# Patient Record
Sex: FEMALE | Race: WHITE | NOT HISPANIC OR LATINO
[De-identification: names, ages, dates, MRNs, and addresses within clinical notes are randomized per-mention and may not be internally consistent; named-entity substitution may affect disease eponyms.]

---

## 2017-06-12 ENCOUNTER — RESULT REVIEW (OUTPATIENT)
Age: 52
End: 2017-06-12

## 2017-06-20 ENCOUNTER — APPOINTMENT (OUTPATIENT)
Dept: MAMMOGRAPHY | Facility: CLINIC | Age: 52
End: 2017-06-20

## 2017-06-20 ENCOUNTER — OUTPATIENT (OUTPATIENT)
Dept: OUTPATIENT SERVICES | Facility: HOSPITAL | Age: 52
LOS: 1 days | End: 2017-06-20
Payer: COMMERCIAL

## 2017-06-20 DIAGNOSIS — Z00.8 ENCOUNTER FOR OTHER GENERAL EXAMINATION: ICD-10-CM

## 2017-06-20 PROCEDURE — 77067 SCR MAMMO BI INCL CAD: CPT

## 2017-06-20 PROCEDURE — 77063 BREAST TOMOSYNTHESIS BI: CPT

## 2017-09-26 ENCOUNTER — APPOINTMENT (OUTPATIENT)
Dept: UROLOGY | Facility: CLINIC | Age: 52
End: 2017-09-26
Payer: COMMERCIAL

## 2017-09-26 DIAGNOSIS — M25.552 PAIN IN LEFT HIP: ICD-10-CM

## 2017-09-26 DIAGNOSIS — M25.512 PAIN IN LEFT SHOULDER: ICD-10-CM

## 2017-09-26 DIAGNOSIS — R35.1 NOCTURIA: ICD-10-CM

## 2017-09-26 DIAGNOSIS — Z86.39 PERSONAL HISTORY OF OTHER ENDOCRINE, NUTRITIONAL AND METABOLIC DISEASE: ICD-10-CM

## 2017-09-26 DIAGNOSIS — R31.9 HEMATURIA, UNSPECIFIED: ICD-10-CM

## 2017-09-26 LAB
BILIRUB UR QL STRIP: NORMAL
CLARITY UR: NORMAL
COLLECTION METHOD: NORMAL
GLUCOSE UR-MCNC: NORMAL
HCG UR QL: NORMAL EU/DL
HGB UR QL STRIP.AUTO: NORMAL
KETONES UR-MCNC: NORMAL
LEUKOCYTE ESTERASE UR QL STRIP: NORMAL
NITRITE UR QL STRIP: NORMAL
PH UR STRIP: NORMAL
PROT UR STRIP-MCNC: NORMAL
SP GR UR STRIP: NORMAL

## 2017-09-26 PROCEDURE — 81003 URINALYSIS AUTO W/O SCOPE: CPT | Mod: QW

## 2017-09-26 PROCEDURE — 99204 OFFICE O/P NEW MOD 45 MIN: CPT

## 2017-09-28 LAB
APPEARANCE: CLEAR
BACTERIA UR CULT: NORMAL
BACTERIA: NEGATIVE
BILIRUBIN URINE: NEGATIVE
BLOOD URINE: ABNORMAL
COLOR: YELLOW
GLUCOSE QUALITATIVE U: NORMAL MG/DL
HYALINE CASTS: 1 /LPF
KETONES URINE: ABNORMAL
LEUKOCYTE ESTERASE URINE: NEGATIVE
MICROSCOPIC-UA: NORMAL
NITRITE URINE: NEGATIVE
PH URINE: 5
PROTEIN URINE: NEGATIVE MG/DL
RED BLOOD CELLS URINE: 3 /HPF
SPECIFIC GRAVITY URINE: 1.02
SQUAMOUS EPITHELIAL CELLS: 1 /HPF
UROBILINOGEN URINE: NORMAL MG/DL
WHITE BLOOD CELLS URINE: 3 /HPF

## 2017-10-03 ENCOUNTER — LABORATORY RESULT (OUTPATIENT)
Age: 52
End: 2017-10-03

## 2017-10-04 ENCOUNTER — FORM ENCOUNTER (OUTPATIENT)
Age: 52
End: 2017-10-04

## 2017-10-05 ENCOUNTER — OUTPATIENT (OUTPATIENT)
Dept: OUTPATIENT SERVICES | Facility: HOSPITAL | Age: 52
LOS: 1 days | End: 2017-10-05
Payer: COMMERCIAL

## 2017-10-05 ENCOUNTER — APPOINTMENT (OUTPATIENT)
Dept: CT IMAGING | Facility: CLINIC | Age: 52
End: 2017-10-05
Payer: COMMERCIAL

## 2017-10-05 DIAGNOSIS — R31.0 GROSS HEMATURIA: ICD-10-CM

## 2017-10-05 PROCEDURE — 74178 CT ABD&PLV WO CNTR FLWD CNTR: CPT

## 2017-10-05 PROCEDURE — 74178 CT ABD&PLV WO CNTR FLWD CNTR: CPT | Mod: 26

## 2017-10-06 ENCOUNTER — TRANSCRIPTION ENCOUNTER (OUTPATIENT)
Age: 52
End: 2017-10-06

## 2017-10-06 LAB
ANION GAP SERPL CALC-SCNC: 14 MMOL/L
BUN SERPL-MCNC: 15 MG/DL
CALCIUM SERPL-MCNC: 10 MG/DL
CHLORIDE SERPL-SCNC: 104 MMOL/L
CO2 SERPL-SCNC: 26 MMOL/L
CREAT SERPL-MCNC: 0.9 MG/DL
GLUCOSE SERPL-MCNC: 90 MG/DL
POTASSIUM SERPL-SCNC: 4.7 MMOL/L
SODIUM SERPL-SCNC: 144 MMOL/L

## 2017-10-10 ENCOUNTER — APPOINTMENT (OUTPATIENT)
Dept: UROLOGY | Facility: CLINIC | Age: 52
End: 2017-10-10
Payer: COMMERCIAL

## 2017-10-10 VITALS
HEART RATE: 68 BPM | SYSTOLIC BLOOD PRESSURE: 156 MMHG | DIASTOLIC BLOOD PRESSURE: 89 MMHG | TEMPERATURE: 98.4 F | OXYGEN SATURATION: 100 %

## 2017-10-10 DIAGNOSIS — R31.0 GROSS HEMATURIA: ICD-10-CM

## 2017-10-10 PROCEDURE — 52000 CYSTOURETHROSCOPY: CPT

## 2017-10-13 PROBLEM — R31.0 GROSS HEMATURIA: Status: ACTIVE | Noted: 2017-09-26

## 2017-10-13 RX ORDER — NITROFURANTOIN (MONOHYDRATE/MACROCRYSTALS) 25; 75 MG/1; MG/1
100 CAPSULE ORAL
Qty: 20 | Refills: 0 | Status: ACTIVE | COMMUNITY
Start: 2017-09-25

## 2017-10-13 RX ORDER — METHYLPREDNISOLONE 4 MG/1
4 TABLET ORAL
Qty: 21 | Refills: 0 | Status: ACTIVE | COMMUNITY
Start: 2017-05-16

## 2017-10-13 RX ORDER — VALACYCLOVIR 1 G/1
1 TABLET, FILM COATED ORAL
Qty: 30 | Refills: 0 | Status: ACTIVE | COMMUNITY
Start: 2017-03-29

## 2017-10-13 RX ORDER — NITROFURANTOIN MACROCRYSTALS 100 MG/1
100 CAPSULE ORAL
Refills: 0 | Status: ACTIVE | COMMUNITY

## 2017-10-13 RX ORDER — PRAVASTATIN SODIUM 20 MG/1
20 TABLET ORAL
Refills: 0 | Status: ACTIVE | COMMUNITY

## 2017-10-16 LAB — CORE LAB FLUID CYTOLOGY: NORMAL

## 2017-10-27 ENCOUNTER — EMERGENCY (EMERGENCY)
Facility: HOSPITAL | Age: 52
LOS: 1 days | Discharge: HOME | End: 2017-10-27
Attending: EMERGENCY MEDICINE | Admitting: EMERGENCY MEDICINE
Payer: COMMERCIAL

## 2017-10-27 VITALS
WEIGHT: 141.98 LBS | DIASTOLIC BLOOD PRESSURE: 91 MMHG | TEMPERATURE: 98 F | SYSTOLIC BLOOD PRESSURE: 163 MMHG | OXYGEN SATURATION: 100 % | HEART RATE: 69 BPM | RESPIRATION RATE: 18 BRPM

## 2017-10-27 VITALS
SYSTOLIC BLOOD PRESSURE: 123 MMHG | TEMPERATURE: 98 F | DIASTOLIC BLOOD PRESSURE: 76 MMHG | OXYGEN SATURATION: 99 % | HEART RATE: 74 BPM | RESPIRATION RATE: 17 BRPM

## 2017-10-27 DIAGNOSIS — E78.5 HYPERLIPIDEMIA, UNSPECIFIED: ICD-10-CM

## 2017-10-27 DIAGNOSIS — R21 RASH AND OTHER NONSPECIFIC SKIN ERUPTION: ICD-10-CM

## 2017-10-27 DIAGNOSIS — Z88.5 ALLERGY STATUS TO NARCOTIC AGENT: ICD-10-CM

## 2017-10-27 DIAGNOSIS — Z88.0 ALLERGY STATUS TO PENICILLIN: ICD-10-CM

## 2017-10-27 DIAGNOSIS — Z88.1 ALLERGY STATUS TO OTHER ANTIBIOTIC AGENTS STATUS: ICD-10-CM

## 2017-10-27 DIAGNOSIS — Z88.2 ALLERGY STATUS TO SULFONAMIDES: ICD-10-CM

## 2017-10-27 PROCEDURE — 99284 EMERGENCY DEPT VISIT MOD MDM: CPT | Mod: 25

## 2017-10-27 PROCEDURE — 99284 EMERGENCY DEPT VISIT MOD MDM: CPT

## 2017-10-27 PROCEDURE — 96375 TX/PRO/DX INJ NEW DRUG ADDON: CPT

## 2017-10-27 PROCEDURE — 96374 THER/PROPH/DIAG INJ IV PUSH: CPT

## 2017-10-27 RX ORDER — FAMOTIDINE 10 MG/ML
20 INJECTION INTRAVENOUS ONCE
Qty: 0 | Refills: 0 | Status: COMPLETED | OUTPATIENT
Start: 2017-10-27 | End: 2017-10-27

## 2017-10-27 RX ORDER — DIPHENHYDRAMINE HCL 50 MG
25 CAPSULE ORAL ONCE
Qty: 0 | Refills: 0 | Status: COMPLETED | OUTPATIENT
Start: 2017-10-27 | End: 2017-10-27

## 2017-10-27 RX ADMIN — Medication 20 MILLIGRAM(S): at 15:30

## 2017-10-27 RX ADMIN — Medication 25 MILLIGRAM(S): at 15:30

## 2017-10-27 RX ADMIN — FAMOTIDINE 20 MILLIGRAM(S): 10 INJECTION INTRAVENOUS at 15:30

## 2017-10-27 NOTE — ED ADULT TRIAGE NOTE - CHIEF COMPLAINT QUOTE
c/o hives all over the body noted at around 03:00 then was started on Prednisone by Urgent care. at around 14:00H pt 's tongue started to swell with accompanied chest pain.

## 2017-10-27 NOTE — ED PROVIDER NOTE - ENMT, MLM
Airway patent, Nasal mucosa clear. Mouth with normal mucosa. Throat has no vesicles, no oropharyngeal exudates and uvula is midline. No tongue or throat swelling.

## 2017-10-27 NOTE — ED PROVIDER NOTE - OBJECTIVE STATEMENT
CO itchy rash on arms legs neck and trunk since this am. Used Diclofenac cream on wrists last night. Has had multiple medication reactions in the past. Seen at  today and given Benadryl and Prednisone 40mg. CO continued itchy rash. Had tongue swelling earlier since resolved.

## 2017-10-27 NOTE — ED ADULT NURSE REASSESSMENT NOTE - NS ED NURSE REASSESS COMMENT FT1
Pt was received C/O mild swelling in her tongue, and rashes on her neck and UE and generalized itching. However, pt denied CP, SOB, palpitation and dizziness. Pt was given her stat meds as ordered. Pt verbalized relief and feeling better S/P her medication administrations. No S/S of distress noted. Pt was discharged as ordered.

## 2017-10-27 NOTE — ED PROVIDER NOTE - MEDICAL DECISION MAKING DETAILS
Allergic reaction possibly to Diclofenac. Already took Benadryl and Prednisone. Plan increase Prednisone to 60mg daily. Dose of IV benadryl and pepcid now. Observe.

## 2017-10-27 NOTE — ED PROVIDER NOTE - CHPI ED SYMPTOMS NEG
no wheezing/no shortness of breath/no vomiting/no difficulty breathing/no nausea/no throat itching/no difficulty swallowing/no cough

## 2017-10-27 NOTE — ED ADULT NURSE NOTE - OBJECTIVE STATEMENT
pt with complaints of hives worsening and of tongue swelling after pt took prednisone she received from urgent care. pt took 2 25 mg benadryl before she came here. pt states tongue swelling has decreased but not fully dissapated. pt denies any breathing difficulties. but states hives are worsening. denies any new foods/detergents.

## 2017-10-27 NOTE — ED ADULT NURSE NOTE - PMH
Arthritis    Basal cell carcinoma  right thigh  Diverticulitis    Hyperlipidemia  "minimal"  Tendonitis  left shoulder

## 2018-06-21 ENCOUNTER — OUTPATIENT (OUTPATIENT)
Dept: OUTPATIENT SERVICES | Facility: HOSPITAL | Age: 53
LOS: 1 days | End: 2018-06-21
Payer: COMMERCIAL

## 2018-06-21 ENCOUNTER — APPOINTMENT (OUTPATIENT)
Dept: MAMMOGRAPHY | Facility: CLINIC | Age: 53
End: 2018-06-21
Payer: COMMERCIAL

## 2018-06-21 DIAGNOSIS — Z00.8 ENCOUNTER FOR OTHER GENERAL EXAMINATION: ICD-10-CM

## 2018-06-21 PROCEDURE — 77067 SCR MAMMO BI INCL CAD: CPT

## 2018-06-21 PROCEDURE — 77067 SCR MAMMO BI INCL CAD: CPT | Mod: 26

## 2018-06-21 PROCEDURE — 77063 BREAST TOMOSYNTHESIS BI: CPT | Mod: 26

## 2018-06-21 PROCEDURE — 77063 BREAST TOMOSYNTHESIS BI: CPT

## 2018-10-27 ENCOUNTER — TRANSCRIPTION ENCOUNTER (OUTPATIENT)
Age: 53
End: 2018-10-27

## 2019-06-04 ENCOUNTER — APPOINTMENT (OUTPATIENT)
Dept: ORTHOPEDIC SURGERY | Facility: CLINIC | Age: 54
End: 2019-06-04
Payer: COMMERCIAL

## 2019-06-04 VITALS
DIASTOLIC BLOOD PRESSURE: 83 MMHG | SYSTOLIC BLOOD PRESSURE: 138 MMHG | WEIGHT: 145 LBS | HEIGHT: 68 IN | BODY MASS INDEX: 21.98 KG/M2 | HEART RATE: 63 BPM

## 2019-06-04 DIAGNOSIS — M25.511 PAIN IN RIGHT SHOULDER: ICD-10-CM

## 2019-06-04 PROCEDURE — 73030 X-RAY EXAM OF SHOULDER: CPT | Mod: RT

## 2019-06-04 PROCEDURE — 99214 OFFICE O/P EST MOD 30 MIN: CPT | Mod: 25

## 2019-06-04 PROCEDURE — 20610 DRAIN/INJ JOINT/BURSA W/O US: CPT | Mod: RT

## 2019-07-16 PROBLEM — M25.511 RIGHT SHOULDER PAIN: Status: ACTIVE | Noted: 2019-07-16

## 2019-07-16 NOTE — PROCEDURE
[de-identified] : Injection: Right shoulder (Subacromial).\par Indication: Bursitis.\par \par A discussion was had with the patient regarding this procedure and all questions were answered. All risks, benefits and alternatives were discussed. These included but were not limited to bleeding, infection, and allergic reaction. Alcohol was used to clean the skin, and betadine was used to sterilize and prep the area in the posterior aspect of the right shoulder. Ethyl chloride spray was then used as a topical anesthetic. A 21-gauge needle was used to inject 4cc of 1% lidocaine and 1cc of 40mg/ml methylprednisolone into the right subacromial space. A sterile bandage was then applied. The patient tolerated the procedure well and there were no complications.

## 2019-07-16 NOTE — DISCUSSION/SUMMARY
[de-identified] : 54-year-old female status post right shoulder rotator cuff repair, new onset right shoulder pain \par \par A discussion was had with the patient regarding potential sources of inflammatory shoulder discomfort following rotator cuff repair. Patient presents with symptoms of bursitis with irritation of the subacromial space. I do not believe she has significant reinjury to the rotator cuff at this time.\par \par Recommendations: Conservative modalities as above (overhead activity rest/activity avoidance until less symptommatic, ice, NSAIDs, home exercise strengthening and stretching program). Injection therapy provided for symptomatic relief\par \par Injection therapy performed to the subacromial space today for therapeutic and diagnostic purposes. \par \par Followup: 2-3mos as needed

## 2019-07-16 NOTE — HISTORY OF PRESENT ILLNESS
[de-identified] : 54 year old female status post left shoulder arthroscopy with subacromial decompression and rotator cuff repair 5/12/15, presents today with right shoulder pain since February 2019. No new injury reported. She stayed away from upper extremity exercises for 8 weeks which has not been helpful. The pain is intermittent brought on when laying on it and has modified movements playing tennis due to pain. Taking Ibuprofen PRN which is helpful. She is here because she think that she may have torn her RTC. Localizes pain to the anterior aspect of the shoulder.

## 2019-07-16 NOTE — PHYSICAL EXAM
[de-identified] : Oriented to time, place, person\par Mood: Normal\par Affect: Normal\par Appearance: Healthy, well appearing, no acute distress.\par Gait: Normal\par Assistive Devices: None\par \par Left shoulder exam\par \par Inspection: No malalignment, No defects, No atrophy\par Skin: No masses, No lesions\par Neck: Negative Spurlings, full ROM, no pain with ROM\par AROM: FF to 180, abduction to 90, ER to 70, IR to the lumbar\par PROM: full\par Painful arc ROM: Painful forward flexion\par Tenderness: Mild bicipital tenderness, mild tenderness to the greater tuberosity/RTC insertion, no anterior shoulder/lesser tuberosity tenderness\par Strength: 4+/5 ER, 5/5 IR in adduction, 4+/5 supraspinatus testing, positive O'Briens test\par AC Joint: No ttp/pain with cross arm testing\par Biceps: Speed Negative, Yergusons Negative\par Impingement test: Positive Baeza, Positive Neer \par Stability: Stable\par Vasc: 2+ radial pulse\par Neuro: AIN, PIN, Ulnar nerve in tact to motor\par Sensation: In tact to light touch throughout\par \par  [de-identified] : \par The following radiographs were ordered and read by me during this patients visit. I reviewed each radiograph in detail with the patient and discussed the findings as highlighted below. \par \par 3 views of the right shoulder were obtained today that show no acute fracture or dislocation. There is no glenohumeral and min AC joint degenerative change seen. Postop changes to the acromion. There is no significant malalignment. No significant other obvious osseous abnormality, otherwise unremarkable.

## 2019-10-02 ENCOUNTER — RX RENEWAL (OUTPATIENT)
Age: 54
End: 2019-10-02

## 2019-10-03 ENCOUNTER — FORM ENCOUNTER (OUTPATIENT)
Age: 54
End: 2019-10-03

## 2019-10-04 ENCOUNTER — OUTPATIENT (OUTPATIENT)
Dept: OUTPATIENT SERVICES | Facility: HOSPITAL | Age: 54
LOS: 1 days | End: 2019-10-04
Payer: COMMERCIAL

## 2019-10-04 ENCOUNTER — APPOINTMENT (OUTPATIENT)
Dept: MRI IMAGING | Facility: CLINIC | Age: 54
End: 2019-10-04
Payer: COMMERCIAL

## 2019-10-04 DIAGNOSIS — M25.511 PAIN IN RIGHT SHOULDER: ICD-10-CM

## 2019-10-04 PROCEDURE — 73221 MRI JOINT UPR EXTREM W/O DYE: CPT

## 2019-10-04 PROCEDURE — 73221 MRI JOINT UPR EXTREM W/O DYE: CPT | Mod: 26,RT

## 2019-10-14 ENCOUNTER — TRANSCRIPTION ENCOUNTER (OUTPATIENT)
Age: 54
End: 2019-10-14

## 2019-10-28 ENCOUNTER — APPOINTMENT (OUTPATIENT)
Dept: ORTHOPEDIC SURGERY | Facility: CLINIC | Age: 54
End: 2019-10-28
Payer: COMMERCIAL

## 2019-10-28 VITALS — WEIGHT: 145 LBS | BODY MASS INDEX: 21.98 KG/M2 | HEIGHT: 68 IN

## 2019-10-28 PROCEDURE — 99214 OFFICE O/P EST MOD 30 MIN: CPT

## 2019-10-30 NOTE — PHYSICAL EXAM
[de-identified] : General: well-appearing, not in acute distress and not obese. \par Gait: normal. \par Oriented to time, place, person\par Mood: Normal\par Affect: Normal\par \par Right shoulder exam\par \par Inspection: No malalignment, No defects, No atrophy\par Skin: No masses, No lesions\par Neck: Negative Spurling's, full ROM, no pain with ROM\par AROM: FF to 180, abduction to 90, ER to 60, IR to mid thoracic\par Painful arc ROM: none\par Tenderness: no bicipital tenderness, + tenderness to the greater tuberosity/RTC insertion, no anterior shoulder/lesser tuberosity tenderness\par Strength: 5/5 ER, 5/5 IR in adduction, 5/5 supraspinatus testing, negative [place]'s test\par AC Joint: No ttp/pain with cross arm testing\par Biceps: Speed Negative, Yergusons Negative\par Impingement test: Negative Baeza, Negative Neer \par Stability: Stable\par Vasc: 2+ radial pulse\par Neuro: AIN, PIN, Ulnar nerve intact to motor\par Sensation: Intact to light touch throughout [de-identified] : MRI right shoulder dated 10.4.19 shows evidence of moderate supraspinatus tendinosis, interstitial tearing anteriorly. A.c. joint arthrosis.\par \par 3 views of the right shoulder were obtained 6-4-19 that show no acute fracture or dislocation. There is no glenohumeral and min AC joint degenerative change seen. Postop changes to the acromion. There is no significant malalignment. No significant other obvious osseous abnormality, otherwise unremarkable.

## 2019-10-30 NOTE — HISTORY OF PRESENT ILLNESS
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 MRI right shoulder dated 10.4.19 shows evidence of interstitial tearing anterior SS. She received a cortisone injection in June which gave her ~6 weeks of relief. The pain is intermittent brought on with lifting overhead. The pain wakes her up at night. Able to finish playing tennis this summer, but may look for fixation if still symptommatic early 2020.

## 2019-10-30 NOTE — ADDENDUM
[FreeTextEntry1] : This note was written by Zina Gomes on 10/28/2019 acting solely as a scribe for Dr. Oswaldo Rodriguez.\par \par All medical record entries made by the Scribe were at my, Dr. Oswaldo Rodriguez, direction and personally dictated by me on 10/28/2019. I have personally reviewed the chart and agree that the record accurately reflects my personal performance of the history, physical exam, assessment and plan.\par

## 2019-10-30 NOTE — DISCUSSION/SUMMARY
[de-identified] : 54-year-old female with right shoulder pain.\par \par Patient has some degeneration of the anterior rotator cuff, there does not appear to be clinically severe, nor does it show any significant high-grade tearing as was the case on the contralateral side. Patient is weighing options regarding surgical intervention versus conservative management. Considering that she is unable to undergo any surgical intervention until early 2020, recommendation at this time as a trial of conservative treatment with physical therapy modalities. If no significant benefit, the patient may opt for surgical debridement/repair as needed.\par \par Recommendations: Conservative modalities as above (overhead activity rest/activity avoidance until less symptomatic, ice, NSAIDs, home exercise strengthening and stretching program).\par \par Begin a trial of PT. Rx given. \par \par Followup: 2 months for possible surgical intervention.

## 2020-02-19 ENCOUNTER — RESULT REVIEW (OUTPATIENT)
Age: 55
End: 2020-02-19

## 2020-02-19 ENCOUNTER — OUTPATIENT (OUTPATIENT)
Dept: OUTPATIENT SERVICES | Facility: HOSPITAL | Age: 55
LOS: 1 days | Discharge: HOME | End: 2020-02-19
Payer: COMMERCIAL

## 2020-02-19 VITALS
HEART RATE: 74 BPM | RESPIRATION RATE: 28 BRPM | OXYGEN SATURATION: 100 % | WEIGHT: 151.02 LBS | HEIGHT: 68 IN | TEMPERATURE: 98 F | DIASTOLIC BLOOD PRESSURE: 74 MMHG | SYSTOLIC BLOOD PRESSURE: 138 MMHG

## 2020-02-19 DIAGNOSIS — Z88.2 ALLERGY STATUS TO SULFONAMIDES: ICD-10-CM

## 2020-02-19 DIAGNOSIS — Z88.5 ALLERGY STATUS TO NARCOTIC AGENT: ICD-10-CM

## 2020-02-19 DIAGNOSIS — K57.30 DIVERTICULOSIS OF LARGE INTESTINE WITHOUT PERFORATION OR ABSCESS WITHOUT BLEEDING: ICD-10-CM

## 2020-02-19 DIAGNOSIS — Z12.11 ENCOUNTER FOR SCREENING FOR MALIGNANT NEOPLASM OF COLON: ICD-10-CM

## 2020-02-19 DIAGNOSIS — Z88.1 ALLERGY STATUS TO OTHER ANTIBIOTIC AGENTS STATUS: ICD-10-CM

## 2020-02-19 DIAGNOSIS — K59.04 CHRONIC IDIOPATHIC CONSTIPATION: ICD-10-CM

## 2020-02-19 PROCEDURE — 88305 TISSUE EXAM BY PATHOLOGIST: CPT | Mod: 26

## 2020-02-19 PROCEDURE — 88305 TISSUE EXAM BY PATHOLOGIST: CPT

## 2020-02-19 RX ORDER — ALPRAZOLAM 0.25 MG
1 TABLET ORAL
Qty: 0 | Refills: 0 | DISCHARGE

## 2020-02-24 DIAGNOSIS — K57.30 DIVERTICULOSIS OF LARGE INTESTINE WITHOUT PERFORATION OR ABSCESS WITHOUT BLEEDING: ICD-10-CM

## 2020-02-24 DIAGNOSIS — K63.5 POLYP OF COLON: ICD-10-CM

## 2020-02-24 DIAGNOSIS — K59.04 CHRONIC IDIOPATHIC CONSTIPATION: ICD-10-CM

## 2020-02-24 DIAGNOSIS — Z88.2 ALLERGY STATUS TO SULFONAMIDES: ICD-10-CM

## 2020-02-24 DIAGNOSIS — Z86.010 PERSONAL HISTORY OF COLONIC POLYPS: ICD-10-CM

## 2020-02-24 DIAGNOSIS — E78.5 HYPERLIPIDEMIA, UNSPECIFIED: ICD-10-CM

## 2020-02-24 DIAGNOSIS — Z88.5 ALLERGY STATUS TO NARCOTIC AGENT: ICD-10-CM

## 2020-02-24 DIAGNOSIS — Z88.1 ALLERGY STATUS TO OTHER ANTIBIOTIC AGENTS STATUS: ICD-10-CM

## 2020-02-24 DIAGNOSIS — K21.9 GASTRO-ESOPHAGEAL REFLUX DISEASE WITHOUT ESOPHAGITIS: ICD-10-CM

## 2020-02-24 DIAGNOSIS — K64.8 OTHER HEMORRHOIDS: ICD-10-CM

## 2020-02-24 DIAGNOSIS — Z88.0 ALLERGY STATUS TO PENICILLIN: ICD-10-CM

## 2020-06-25 ENCOUNTER — APPOINTMENT (OUTPATIENT)
Dept: ORTHOPEDIC SURGERY | Facility: CLINIC | Age: 55
End: 2020-06-25
Payer: COMMERCIAL

## 2020-06-25 VITALS — BODY MASS INDEX: 21.98 KG/M2 | HEIGHT: 68 IN | WEIGHT: 145 LBS

## 2020-06-25 VITALS — TEMPERATURE: 97.7 F

## 2020-06-25 DIAGNOSIS — M25.562 PAIN IN LEFT KNEE: ICD-10-CM

## 2020-06-25 DIAGNOSIS — G89.29 PAIN IN LEFT KNEE: ICD-10-CM

## 2020-06-25 PROCEDURE — 73564 X-RAY EXAM KNEE 4 OR MORE: CPT | Mod: LT

## 2020-06-25 PROCEDURE — 99214 OFFICE O/P EST MOD 30 MIN: CPT

## 2020-06-25 NOTE — PHYSICAL EXAM
[de-identified] : Oriented to time, place, person\par Mood: Normal\par Affect: Normal\par Appearance: Healthy, well appearing, no acute distress.\par Gait: Normal\par Assistive Devices: None\par \par Left knee exam\par \par Skin: Clean, dry, intact\par Inspection: No obvious malalignment, no masses, posterior joint swelling, positive effusion\par Pulses: 2+ DP/PT pulses\par ROM: 0-130 degrees of flexion.  Positive pain with deep knee flexion\par Tenderness: Positive MJLT. No LJLT. No pain over the patella facets. No pain to the quadriceps tendon. No pain to the patella tendon.  Mild posterior knee tenderness.\par Stability: Stable to varus, valgus. Negative lachman testing. Negative anterior drawer, negative posterior drawer.\par Strength: 5/5 Q/H/TA/GS/EHL, without atrophy\par Neuro: In tact to light touch throughout, DTR's normal\par Additional tests: Positive McMurrays test, Negative patellar grind test. \par  [de-identified] : \par The following radiographs were ordered and read by me during this patients visit. I reviewed each radiograph in detail with the patient and discussed the findings as highlighted below. \par \par 4 views of the left knee were obtained today that show no acute fracture or dislocation. There is no medial, no lateral and no patellofemoral degenerative change seen. There is no significant malalignment. No significant other obvious osseous abnormality, otherwise unremarkable.

## 2020-06-25 NOTE — HISTORY OF PRESENT ILLNESS
[de-identified] : 55 year old female presents today with left knee pain x ~1 year. No injury reported. The pain is presents when playing tennis, recently noticed that the knee swells. Takes ibuprofen as needed.  Bracing while playing tennis gives patient a sense of stability. Also recently started using a Peloton with fair intensity, and notices the pain when her leg falls out to the side. Pain is global, and non-specific. Denies buckling, locking, catching, numbness or tingling.

## 2020-06-25 NOTE — DISCUSSION/SUMMARY
[de-identified] : 55-year-old female with left knee pain\par \par Patient presents for evaluation of left knee pain. The patient's symptoms are consistent with possible meniscal pathology through the medial compartment of the knee with positive provocative meniscal testing as well as joint line tenderness.  Patient also has a significant swelling and mass to the posterior joint which is consistent with a Baker's cyst. Discussed with the patient in detail the concern for underlying internal derangement to the meniscus and possible treatment options that may include conservative management (e.g. RICE, activity modification, PT, injection therapy) versus operative arthroscopy. Discussed that treatment would ilkely depend on the nature of the injury, quality of the chondral surfaces (degree of arthrosis) as seen on MRI imaging. \par \par Recommendation: Rest, ice, compression, elevation (RICE) and OTC NSAID's as instructed until MRI imaging.  Limited impact loading activity\par \par Followup after MRI.

## 2020-06-29 ENCOUNTER — APPOINTMENT (OUTPATIENT)
Dept: MRI IMAGING | Facility: CLINIC | Age: 55
End: 2020-06-29
Payer: COMMERCIAL

## 2020-06-29 ENCOUNTER — RESULT REVIEW (OUTPATIENT)
Age: 55
End: 2020-06-29

## 2020-06-29 ENCOUNTER — OUTPATIENT (OUTPATIENT)
Dept: OUTPATIENT SERVICES | Facility: HOSPITAL | Age: 55
LOS: 1 days | End: 2020-06-29
Payer: COMMERCIAL

## 2020-06-29 DIAGNOSIS — Z00.8 ENCOUNTER FOR OTHER GENERAL EXAMINATION: ICD-10-CM

## 2020-06-29 PROCEDURE — 73721 MRI JNT OF LWR EXTRE W/O DYE: CPT | Mod: 26,LT

## 2020-06-29 PROCEDURE — 73721 MRI JNT OF LWR EXTRE W/O DYE: CPT

## 2020-07-08 ENCOUNTER — APPOINTMENT (OUTPATIENT)
Dept: ORTHOPEDIC SURGERY | Facility: CLINIC | Age: 55
End: 2020-07-08
Payer: COMMERCIAL

## 2020-07-08 PROCEDURE — 99214 OFFICE O/P EST MOD 30 MIN: CPT | Mod: 95

## 2020-07-09 NOTE — HISTORY OF PRESENT ILLNESS
[Verbal consent obtained from patient] : the patient, [unfilled] [Home] : at home, [unfilled] , at the time of the visit. [Medical Office: ([facility])___] : at the medical office located in  [FreeTextEntry4] : Mary Lou Haynes [de-identified] : 55 year old female presents today for follow up of left knee pain x ~1 year. She obtained MRI and is here for review of results, concern for a popliteal cyst/MMT. Pain has remained stable since last visit. No injury reported. The pain is presents when playing tennis/using Peloton, recently noticed that the knee swells. Takes ibuprofen as needed.  Bracing while playing tennis gives patient a sense of stability. Pain is global, and non-specific. Denies buckling, locking, catching, numbness or tingling.

## 2020-07-09 NOTE — PHYSICAL EXAM
[de-identified] : Oriented to time, place, person\par Mood: Normal\par Affect: Normal\par Appearance: Healthy, well appearing, no acute distress.\par Gait: Normal\par Assistive Devices: None\par \par Left knee exam\par \par Skin: Clean, dry, intact\par Inspection: No obvious malalignment, no masses, posterior joint swelling, positive effusion\par Pulses: Pink perfused\par ROM: 0-130 degrees of flexion.  Positive pain with deep knee flexion\par Tenderness: Positive MJLT. No LJLT. No pain over the patella facets. No pain to the quadriceps tendon. No pain to the patella tendon.  Mild posterior knee tenderness.\par Stability: Stable to varus, valgus. Negative lachman testing. Negative anterior drawer, negative posterior drawer.\par Strength: 5/5 Q/H/TA/GS/EHL, without atrophy\par Neuro: In tact to light touch throughout, DTR's normal\par Additional tests: Positive McMurrays test, Negative patellar grind test. \par  [de-identified] : 4 views of the left knee were obtained 6.25.20 that show no acute fracture or dislocation. There is no medial, no lateral and no patellofemoral degenerative change seen. There is no significant malalignment. No significant other obvious osseous abnormality, otherwise unremarkable.\par \par MRI left knee dated 6.29.20 shows evidence of a large posterior Baker's cyst 2.7x7.7cm.  Complex medial meniscus tear.

## 2020-07-09 NOTE — DISCUSSION/SUMMARY
[de-identified] : 55-year-old female with left knee popliteal cyst/MMT\par \par As expected, patient has a large posterior popliteal cyst consistent with degenerative meniscal pathology through the medial compartment.  Considering the size and location of the popliteal cyst, recommendation would be for aspiration of the cyst with local cortisone injection to see how her symptoms improve.  If pain resolves, I would continue to treat the popliteal cyst conservatively.  If pain were to persist through the posterior medial compartment, patient may be a candidate for surgical arthroscopy given the degenerative meniscus injury.\par \par Recommendation: Rest, ice, compression, elevation (RICE) and OTC NSAID's as instructed.  Limited impact loading activity as needed.\par \par Ultrasound aspiration left knee Rx provided.\par \par Followup after MRI.

## 2020-07-21 ENCOUNTER — OUTPATIENT (OUTPATIENT)
Dept: OUTPATIENT SERVICES | Facility: HOSPITAL | Age: 55
LOS: 1 days | End: 2020-07-21
Payer: COMMERCIAL

## 2020-07-21 ENCOUNTER — APPOINTMENT (OUTPATIENT)
Dept: ULTRASOUND IMAGING | Facility: CLINIC | Age: 55
End: 2020-07-21

## 2020-07-21 DIAGNOSIS — Z00.8 ENCOUNTER FOR OTHER GENERAL EXAMINATION: ICD-10-CM

## 2020-07-21 PROCEDURE — 20611 DRAIN/INJ JOINT/BURSA W/US: CPT

## 2020-07-21 PROCEDURE — 20611 DRAIN/INJ JOINT/BURSA W/US: CPT | Mod: LT

## 2020-08-03 ENCOUNTER — APPOINTMENT (OUTPATIENT)
Dept: ORTHOPEDIC SURGERY | Facility: CLINIC | Age: 55
End: 2020-08-03
Payer: COMMERCIAL

## 2020-08-03 VITALS — TEMPERATURE: 97.8 F

## 2020-08-03 DIAGNOSIS — M71.22 SYNOVIAL CYST OF POPLITEAL SPACE [BAKER], LEFT KNEE: ICD-10-CM

## 2020-08-03 PROCEDURE — 99214 OFFICE O/P EST MOD 30 MIN: CPT

## 2020-08-03 NOTE — HISTORY OF PRESENT ILLNESS
[de-identified] : 55 year old female presents today for follow up of chronic left knee pain.  She had an aspiration of popliteal cyst and cortisone injection  2 weeks ago which only gave her 2 weeks of relief. Pain has since returned. The pain is present when playing tennis/using Peloton, recently noticed that the knee swells. Takes ibuprofen as needed.  Bracing while playing tennis gives patient a sense of stability. Pain is posterior. Denies buckling, locking, catching, numbness or tingling.

## 2020-08-03 NOTE — DISCUSSION/SUMMARY
[de-identified] : 55-year-old female with left knee popliteal cyst/MMT\par \par Patient obtain some good relief of symptoms with aspiration, but affect was fleeting.  Patient continues to have pain radiating through the medial and posterior compartments consistent with degenerative tear of the medial meniscus.  Considering inability to improve significantly with pure aspiration, recommendation at this time is for consideration of surgical management with surgical arthroscopy of the left knee with partial meniscectomy.\par \par All risks, benefits and alternatives to the proposed surgical procedure, as well as the need for formal post-operative rehabilitation were discussed in great detail with the patient. Risks include but are not limited to pain, bleeding, infection, neurovascular injury, stiffness, arthrosis, medical complications (including DVT, PE, MI), and risks of anesthesia. \par \par A discussion was also had with the patient regarding additional risk given recent Covid-19 pandemic; including the potential additional risk of anesthesia and any medical comorbidities that the patient has.  It was made clear to the patient that we are taking all precautions regarding Covid-19 with regards to surgical scheduling and perioperative care.  The patient understands that current protocol requires Covid-19 testing no more than 48hrs prior to surgery, and PST's may be performed onsite at the day of surgery. \par \par The patient expressed understanding and all questions were answered. The patient is electing to proceed, and will have the patient scheduled accordingly.

## 2020-08-03 NOTE — PHYSICAL EXAM
[de-identified] : Oriented to time, place, person\par Mood: Normal\par Affect: Normal\par Appearance: Healthy, well appearing, no acute distress.\par Gait: Normal\par Assistive Devices: None\par \par Left knee exam\par \par Skin: Clean, dry, intact\par Inspection: No obvious malalignment, no masses, posterior joint swelling, positive effusion\par Pulses: Pink perfused\par ROM: 0-130 degrees of flexion.  Positive pain with deep knee flexion\par Tenderness: Positive MJLT. No LJLT. No pain over the patella facets. No pain to the quadriceps tendon. No pain to the patella tendon.  Mild posterior knee tenderness.\par Stability: Stable to varus, valgus. Negative lachman testing. Negative anterior drawer, negative posterior drawer.\par Strength: 5/5 Q/H/TA/GS/EHL, without atrophy\par Neuro: In tact to light touch throughout, DTR's normal\par Additional tests: Positive McMurrays test, Negative patellar grind test. \par  [de-identified] : 4 views of the left knee were obtained 6.25.20 that show no acute fracture or dislocation. There is no medial, no lateral and no patellofemoral degenerative change seen. There is no significant malalignment. No significant other obvious osseous abnormality, otherwise unremarkable.\par \par MRI left knee dated 6.29.20 shows evidence of a large posterior Baker's cyst 2.7x7.7cm.  Complex medial meniscus tear.

## 2020-08-17 ENCOUNTER — EMERGENCY (EMERGENCY)
Facility: HOSPITAL | Age: 55
LOS: 0 days | Discharge: HOME | End: 2020-08-17
Payer: COMMERCIAL

## 2020-08-17 VITALS
RESPIRATION RATE: 16 BRPM | HEART RATE: 68 BPM | SYSTOLIC BLOOD PRESSURE: 136 MMHG | TEMPERATURE: 99 F | DIASTOLIC BLOOD PRESSURE: 80 MMHG | OXYGEN SATURATION: 100 %

## 2020-08-17 DIAGNOSIS — Z11.59 ENCOUNTER FOR SCREENING FOR OTHER VIRAL DISEASES: ICD-10-CM

## 2020-08-17 DIAGNOSIS — Z03.818 ENCOUNTER FOR OBSERVATION FOR SUSPECTED EXPOSURE TO OTHER BIOLOGICAL AGENTS RULED OUT: ICD-10-CM

## 2020-08-17 PROCEDURE — U0003: CPT

## 2020-08-17 PROCEDURE — 99283 EMERGENCY DEPT VISIT LOW MDM: CPT

## 2020-08-17 NOTE — ED STATDOCS - OBJECTIVE STATEMENT
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

## 2020-08-17 NOTE — ED STATDOCS - PATIENT PORTAL LINK FT
You can access the FollowMyHealth Patient Portal offered by [facility] by registering at the following website: [email or web address]. By joining [facility]’s FollowMyHealth portal, you will also be able to view your health information using other applications (apps) compatible with our system.

## 2020-08-17 NOTE — ED ADULT TRIAGE NOTE - CHIEF COMPLAINT QUOTE
Pt presents to ED due to complaints of possible exposure for COVID19 testing.  Patient provides verbal consent to receive results via text or email.

## 2020-08-18 LAB — SARS-COV-2 RNA SPEC QL NAA+PROBE: SIGNIFICANT CHANGE UP

## 2020-08-26 RX ORDER — OXYCODONE AND ACETAMINOPHEN 5; 325 MG/1; MG/1
5-325 TABLET ORAL
Qty: 10 | Refills: 0 | Status: ACTIVE | COMMUNITY
Start: 2020-08-26 | End: 1900-01-01

## 2020-08-28 ENCOUNTER — APPOINTMENT (OUTPATIENT)
Dept: ORTHOPEDIC SURGERY | Facility: AMBULATORY SURGERY CENTER | Age: 55
End: 2020-08-28
Payer: COMMERCIAL

## 2020-08-28 PROCEDURE — 29880 ARTHRS KNE SRG MNISECTMY M&L: CPT | Mod: LT

## 2020-09-09 ENCOUNTER — APPOINTMENT (OUTPATIENT)
Dept: ORTHOPEDIC SURGERY | Facility: CLINIC | Age: 55
End: 2020-09-09
Payer: COMMERCIAL

## 2020-09-09 VITALS — TEMPERATURE: 98.2 F

## 2020-09-09 DIAGNOSIS — S83.232D COMPLEX TEAR OF MEDIAL MENISCUS, CURRENT INJURY, LEFT KNEE, SUBSEQUENT ENCOUNTER: ICD-10-CM

## 2020-09-09 PROCEDURE — 99024 POSTOP FOLLOW-UP VISIT: CPT

## 2020-09-10 PROBLEM — S83.232D COMPLEX TEAR OF MEDIAL MENISCUS OF LEFT KNEE AS CURRENT INJURY, SUBSEQUENT ENCOUNTER: Status: ACTIVE | Noted: 2020-07-08

## 2020-09-10 NOTE — HISTORY OF PRESENT ILLNESS
[0] : no pain reported [Chills] : no chills [Fever] : no fever [Vomiting] : no vomiting [Nausea] : no nausea [Discharge] : absent of discharge [Erythema] : not erythematous [Healed] : healed [Clean/Dry/Intact] : clean, dry and intact [Dehiscence] : not dehisced [Swelling] : not swollen [Vascular Intact] : ~T peripheral vascular exam normal [Neuro Intact] : an unremarkable neurological exam [Doing Well] : is doing well [de-identified] : 55-year-old female status post left knee PMM/PLM 8/28/20 [Excellent Pain Control] : has excellent pain control [No Sign of Infection] : is showing no signs of infection [de-identified] : Left knee exam\par \par Skin: Incision(s) clean, dry, intact, no drainage, healed\par Inspection: No significant swelling, no residual effusion\par Pulses: 2+ DP/PT pulses\par ROM: Full\par Tenderness: Minimal\par Stability: Stable\par Strength: Intact Q/H/TA/GS/EHL\par Neuro: In tact to light touch throughout [de-identified] : 55-year-old female status post left knee PMM/PLM. She is doing well. She is not taking pain medication. Denies post op complications.  Denies any significant pain, denies any issues from surgery.  Has return to low impact exercise already. [de-identified] : 55-year-old female status post left knee PMM/PLM\par \par The patient presents for the first postoperative visit following partial meniscectomy. All intraoperative imaging was discussed in detail with the patient including the quality and nature of the meniscus injury as well as the quality of the chondral surfaces. Discussion was also centered upon the rehabilitation required following arthroscopy, and patient is agreeable to treatment plan.\par \par Recommendations:\par 1. HEP: WBAT, AAROM/AROM to tolerance with immediate full ROM as goal.  We will hold off on PT at this time\par 2. Therapeutic exercises: Quad/Hamstring sets, co-contractions, SLR, HEP.\par 3. Meds: Wean pain medication, transition to OTC NSAID's/tylenol as tolerated\par 4. Bracing: None\par \par Follow up 4wks for re-evaluation.

## 2023-12-06 NOTE — ED PROVIDER NOTE - NS ED MD DISPO DISCHARGE
----- Message from Jc Sparrow MD sent at 12/6/2023  9:12 AM CST -----  I would like to inform you about the biopsy result of your endoscopy.  The tissue sample from polyp was removed did not survive processing.  I recommend repeating colonoscopy in 5 years given the quality of your prep.    Sincerely yours,   Jc Sparrow MD  Gastroenterology     Home

## 2024-03-01 ENCOUNTER — APPOINTMENT (OUTPATIENT)
Dept: CT IMAGING | Facility: CLINIC | Age: 59
End: 2024-03-01
Payer: SELF-PAY

## 2024-03-01 PROCEDURE — 75571 CT HRT W/O DYE W/CA TEST: CPT

## 2024-05-20 ENCOUNTER — APPOINTMENT (OUTPATIENT)
Dept: ORTHOPEDIC SURGERY | Facility: CLINIC | Age: 59
End: 2024-05-20
Payer: COMMERCIAL

## 2024-05-20 DIAGNOSIS — M75.111 INCOMPLETE ROTATOR CUFF TEAR OR RUPTURE OF RIGHT SHOULDER, NOT SPECIFIED AS TRAUMATIC: ICD-10-CM

## 2024-05-20 PROCEDURE — 99203 OFFICE O/P NEW LOW 30 MIN: CPT

## 2024-05-21 PROBLEM — M75.111 NONTRAUMATIC INCOMPLETE TEAR OF RIGHT ROTATOR CUFF: Status: ACTIVE | Noted: 2019-10-30

## 2024-05-21 NOTE — DISCUSSION/SUMMARY
[de-identified] : 59-year-old female with right shoulder pain.  Patient presents for further evaluation of her right shoulder.  Symptoms have been manageable and prior MRI suggested low-grade degenerative interstitial tearing.  Patient's symptoms today appear to be more related through the clavicle consistent with AC joint/sternoclavicular joint irritation.  However, given recent progression of symptoms we recommended additional MRI imaging to determine any progression of rotator cuff disease.  Patient is agreeable.    Recommendations: Rest/activity avoidance until less symptomatic with subsequent gradual return to full activity as tolerated. Patient may also use OTC NSAID's or acetaminophen as tolerated, with application of ice to the area 2-3x daily for 20 minutes after periods of activity.  MRI imaging right shoulder.  Followup after MRI.

## 2024-05-21 NOTE — HISTORY OF PRESENT ILLNESS
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 MRI right shoulder in 2019 shows evidence of interstitial tearing anterior SS. She received a cortisone injection in June 2019 which gave her ~6 weeks of relief.  Patient completed PT in 2019, was unable to continue due to the pandemic. She has noticed that in the past 6 months pain has gotten worse. Patient playing tennis 4 x per week and is playing through the pain. The pain is presents with certain movements, lifting overhead and exertion. She is status post left shoulder arthroscopy with subacromial decompression and rotator cuff repair 5/12/15 with no active symptoms.

## 2024-05-21 NOTE — PHYSICAL EXAM
[de-identified] : Right shoulder exam  Inspection: No malalignment, No defects, No atrophy Skin: No masses, No lesions Neck: Negative Spurling's, full ROM, no pain with ROM AROM: FF to 180, abduction to 90, ER to 60, IR to mid thoracic Painful arc ROM: Pain with terminal ROM Tenderness: +bicipital tenderness, +pain over the sternoclavicular joint no tenderness to the greater tuberosity/RTC insertion, no anterior shoulder/lesser tuberosity tenderness Strength: 5/5 ER, 5/5 IR in adduction, 5/5 supraspinatus testing, + [place]'s test AC Joint: mild ttp/pain with cross arm testing Biceps: Speed Negative, [place]ons Negative Impingement test: +Baeza, +Neer  Stability: Stable Vasc: 2+ radial pulse Neuro: AIN, PIN, Ulnar nerve intact to motor Sensation: Intact to light touch throughout [de-identified] : MRI right shoulder dated 10.4.19 shows evidence of moderate supraspinatus tendinosis, interstitial tearing anteriorly. A.c. joint arthrosis.  3 views of the right shoulder were obtained 6-4-19 that show no acute fracture or dislocation. There is no glenohumeral and min AC joint degenerative change seen. Postop changes to the acromion. There is no significant malalignment. No significant other obvious osseous abnormality, otherwise unremarkable.

## 2024-05-21 NOTE — ADDENDUM
[FreeTextEntry1] : This note was written by Shanel Caal on 05/20/2024 acting solely as a scribe for Dr. Oswaldo Rodriguez.  All medical record entries made by the Scribe were at my, Dr. Oswaldo Rodriguez, direction and personally dictated by me on 05/20/2024. I have personally reviewed the chart and agree that the record accurately reflects my personal performance of the history, physical exam, assessment and plan.

## 2025-06-10 ENCOUNTER — APPOINTMENT (OUTPATIENT)
Facility: CLINIC | Age: 60
End: 2025-06-10

## 2025-06-10 VITALS
DIASTOLIC BLOOD PRESSURE: 78 MMHG | SYSTOLIC BLOOD PRESSURE: 126 MMHG | HEART RATE: 65 BPM | BODY MASS INDEX: 22.58 KG/M2 | HEIGHT: 68 IN | WEIGHT: 149 LBS

## 2025-06-10 LAB
BILIRUB UR QL STRIP: NEGATIVE
CLARITY UR: CLEAR
COLLECTION METHOD: NORMAL
GLUCOSE UR-MCNC: NEGATIVE
HCG UR QL: 0.2 EU/DL
HGB UR QL STRIP.AUTO: NORMAL
KETONES UR-MCNC: NEGATIVE
LEUKOCYTE ESTERASE UR QL STRIP: NEGATIVE
NITRITE UR QL STRIP: NEGATIVE
PH UR STRIP: 6
PROT UR STRIP-MCNC: NEGATIVE
SP GR UR STRIP: 1.01

## 2025-06-10 PROCEDURE — 99459 PELVIC EXAMINATION: CPT

## 2025-06-10 PROCEDURE — 99204 OFFICE O/P NEW MOD 45 MIN: CPT | Mod: 25

## 2025-06-10 PROCEDURE — 51701 INSERT BLADDER CATHETER: CPT

## 2025-06-10 PROCEDURE — 81003 URINALYSIS AUTO W/O SCOPE: CPT | Mod: QW

## 2025-06-11 LAB
APPEARANCE: CLEAR
BACTERIA: NEGATIVE /HPF
BILIRUBIN URINE: NEGATIVE
BLOOD URINE: NEGATIVE
CAST: 0 /LPF
COLOR: YELLOW
EPITHELIAL CELLS: 0 /HPF
GLUCOSE QUALITATIVE U: NEGATIVE MG/DL
KETONES URINE: NEGATIVE MG/DL
LEUKOCYTE ESTERASE URINE: NEGATIVE
MICROSCOPIC-UA: NORMAL
NITRITE URINE: NEGATIVE
PH URINE: 6
PROTEIN URINE: NEGATIVE MG/DL
RED BLOOD CELLS URINE: 0 /HPF
SPECIFIC GRAVITY URINE: 1.01
UROBILINOGEN URINE: 0.2 MG/DL
WHITE BLOOD CELLS URINE: 0 /HPF

## 2025-06-18 LAB — BACTERIA UR CULT: NORMAL

## 2025-06-22 ENCOUNTER — NON-APPOINTMENT (OUTPATIENT)
Age: 60
End: 2025-06-22

## 2025-06-30 ENCOUNTER — TRANSCRIPTION ENCOUNTER (OUTPATIENT)
Age: 60
End: 2025-06-30

## 2025-06-30 ENCOUNTER — APPOINTMENT (OUTPATIENT)
Dept: UROGYNECOLOGY | Facility: CLINIC | Age: 60
End: 2025-06-30

## 2025-06-30 VITALS — DIASTOLIC BLOOD PRESSURE: 80 MMHG | SYSTOLIC BLOOD PRESSURE: 120 MMHG

## 2025-06-30 PROCEDURE — 51729 CYSTOMETROGRAM W/VP&UP: CPT

## 2025-06-30 PROCEDURE — 51797 INTRAABDOMINAL PRESSURE TEST: CPT

## 2025-06-30 PROCEDURE — 51784 ANAL/URINARY MUSCLE STUDY: CPT

## 2025-06-30 PROCEDURE — 51741 ELECTRO-UROFLOWMETRY FIRST: CPT

## 2025-06-30 RX ORDER — ALPRAZOLAM 0.25 MG/1
0.25 TABLET ORAL
Refills: 0 | Status: ACTIVE | COMMUNITY

## 2025-06-30 RX ORDER — ESTRADIOL 1.25 MG/1.25G
GEL TOPICAL
Refills: 0 | Status: ACTIVE | COMMUNITY

## 2025-06-30 RX ORDER — PRAVASTATIN SODIUM 10 MG/1
10 TABLET ORAL
Refills: 0 | Status: ACTIVE | COMMUNITY